# Patient Record
Sex: FEMALE | Race: WHITE
[De-identification: names, ages, dates, MRNs, and addresses within clinical notes are randomized per-mention and may not be internally consistent; named-entity substitution may affect disease eponyms.]

---

## 2020-02-17 ENCOUNTER — HOSPITAL ENCOUNTER (EMERGENCY)
Dept: HOSPITAL 97 - ER | Age: 40
Discharge: HOME | End: 2020-02-17
Payer: SELF-PAY

## 2020-02-17 VITALS — OXYGEN SATURATION: 100 % | DIASTOLIC BLOOD PRESSURE: 87 MMHG | SYSTOLIC BLOOD PRESSURE: 159 MMHG | TEMPERATURE: 98.3 F

## 2020-02-17 DIAGNOSIS — H60.8X2: ICD-10-CM

## 2020-02-17 DIAGNOSIS — H66.92: Primary | ICD-10-CM

## 2020-02-17 PROCEDURE — 99282 EMERGENCY DEPT VISIT SF MDM: CPT

## 2020-02-17 NOTE — XMS REPORT
Summary of Care

 Created on:2019



Patient:Orly Grover

Sex:Female

:1980

External Reference #:[id number]





Demographics







 Address   [address]



   [place] [address]

 

 Mobile Phone  [phone]

 

 Home Phone  [phone]

 

 Phone  [phone]

 

 Email Address  [email or web address]

 

 Preferred Language  English

 

 Marital Status  Single

 

 Jehovah's witness Affiliation  Unknown

 

 Race  White

 

 Ethnic Group  Not  or 









Author







 Organization  [facility]

 

 Address  [address] [place] [address]









Support







 Name  Relationship  Address  Phone

 

 Maribel Segura  Unavailable  [address]  [phone]



     [place] [address]  

 

 Parminder Grover  Unavailable   [address]  [phone]



     [place] [address]  









Care Team Providers







 Name  Role  Phone

 

 Kirsty Gonzalez [facility]  Primary Care Provider  [phone]









Reason for Visit







 Reason  Comments

 

 Assessment  

 

 Rx Concern/Question  







Encounter Details







 Date  Type  Department  Care Team  Description

 

 2019  Telephone  [facility]-  Kirsty Gonzalez,  Assessment; Rx



     [facility]



  Concern/Question



     [address] [facility]



  [address]



  



     [facility]



  



     [address]



  [place], [place] [address] [phone] (Fax)  







Allergies

No Known Allergiesdocumented as of this encounter (statuses as of 2019)



Medications







 Medication  Sig  Dispensed  Refills  Start Date  End Date  Status

 

 sulfamethoxazole-trim  Take 1 tablet by  6 tablet  0  2019  
Active



 ethoprim (BACTRIM DS)  mouth 2 (two)          



 800-160 mg per  times daily for 3          



 tabletIndications:  days.          



 Acute cystitis            



 without hematuria            



documented as of this encounter (statuses as of 2019)



Active Problems







 Problem  Noted Date

 

 BMI 40.0-44.9, adult  2018

 

 Abnormal uterine bleeding  2015

 

 Morbid obesity  2013



documented as of this encounter (statuses as of 2019)



Resolved Problems







 Problem  Noted Date  Resolved Date

 

 Yeast infection of the vagina  2015  08/15/2019

 

 Nexplanon in place  2015  08/15/2019

 

 Surveillance of previously prescribed contraceptive method  2015  08/15/
2019









 Overview: 







 ICD10 Diagnosis Term Replacer Utility









 Encounter for routine gynecological examination  2015  08/15/2019









 Overview: 







 ICD10 Diagnosis Term Replacer Utility









 Encounter for removal of intrauterine contraceptive device  2013

 

 Insertion of Nexplanon  2013









 Overview: 







 Exp 2016









 Encounter for routine gynecological examination  2013









 Overview: 







 ICD10 Diagnosis Term Replacer Utility









 Presence of intrauterine contraceptive device  2013









 Overview: 



Mirena IUD expires 2014.



 



 Medical records received from [facility] 2013. Impression
: IUD in good position within the endometrium. Two small cystic structures in 
the left adnexa probably representing ovarian cysts.



 



 Medical records received from [facility] 2013. Pelvic US: Impression: IUD 
is in place in the fundal endometrium. Otherwise negative study.









 Metrorrhagia  2013









 Overview: 







 Irregular bleeding in 2013.









 Elevated blood pressure reading without diagnosis of  2013



 hypertension    

 

 Need for prophylactic vaccination with combined  2013



 diphtheria-tetanus-pertussis (DTP) vaccine    









 Overview: 







 Out of stock 2013.



documented as of this encounter (statuses as of 2019)



Immunizations







 Name  Administration Dates  Next Due

 

 PPD (TB)  2015  

 

 Rubella  2004  

 

 Td  1999  

 

 Tdap  2013  



documented as of this encounter



Social History







 Tobacco Use  Types  Packs/Day  Years Used  Date

 

 Never Smoker        









 Smokeless Tobacco: Never Used      









 Alcohol Use  Drinks/Week  oz/Week  Comments

 

 No      









 Sex Assigned at Birth  Date Recorded

 

 Not on file  









 Job Start Date  Occupation  Industry

 

 Not on file  Not on file  Not on file









 Travel History  Travel Start  Travel End









 No recent travel history available.



documented as of this encounter



Last Filed Vital Signs

Not on filedocumented in this encounter



Plan of Treatment







 Date  Type  Specialty  Care Team  Description

 

 2019  Technician Visit  [facility]  Lab, [facility]  

 

 11/15/2019  Office Visit  [facility]  Kirsty Gonzalez, FNP



  



       [address]



  



       [address]



  



       [place] [address]



  



       [phone] (Fax)  









 Name  Type  Priority  Associated Diagnoses  Order Schedule

 

 URINE CULTURE  LAB  Routine  Dysuria  Expected: 2019, Expires:



         2019









 Health Maintenance  Due Date  Last Done  Comments

 

 VARICELLA VACCINES (1 of 2 -  1995    



 13+ 2-dose series)      

 

 INFLUENZA VACCINE (#1)  2019    

 

 PAP SMEAR  2021,  



     2013,  



     10/21/2003  

 

 DTaP,Tdap,and Td Vaccines (3  2023,  



 - Td)    1999  

 

 PNEUMOCOCCAL 0-64 YEARS  Aged Out    No longer eligible based



 COMBINED SERIES      on patient's age to



       complete this topic



documented as of this encounter



Results

Not on filedocumented in this encounter



Visit Diagnoses







 Diagnosis

 

 Dysuria - Primary



documented in this encounter



Insurance







 Payer  Benefit Plan  Subscriber ID  Effective  Phone  Address  Type



   / Group    Dates      

 

 [facility]  [facility]  10/1/2018-Pres  [phone]  
Medicaid



 WOMEN      ent  2003



  



           [place]  



           [address]  

 

 [facility]  xxxxxxxxx  2015-Prese  [phone]  [address]  Medicaid



       [address]  [address]



  



           [place]  



           [address]  



documented as of this encounter



Advance Directives







 Name  Relationship  Healthcare Agent  Communication



     Relationship  

 

 Maribel Segura  Mother  Primary healthcare agent  [phone]



       (Home)[phone] (Mobile)

 

 Parminder Grover  Other  First alternate healthcare  [phone]



     agent  (Mobile)[phone] (Home)

## 2020-02-17 NOTE — XMS REPORT
Summary of Care

 Created on:August 15, 2019



Patient:Orly Grover

Sex:Female

:1980

External Reference #:[id number]





Demographics







 Address  [address]



   [place] [address]

 

 Mobile Phone  [phone]

 

 Home Phone  [phone]

 

 Phone  [phone]

 

 Email Address  [email or web address]

 

 Preferred Language  English

 

 Marital Status  Single

 

 Confucianism Affiliation  Unknown

 

 Race  White

 

 Ethnic Group  Not  or 









Author







 Organization  [facility]

 

 Address  [address] [place] [address]









Support







 Name  Relationship  Address  Phone

 

 Maribel Segura  Unavailable  [address]  [phone]



     [place] [address]  

 

 Parminder Grover  Unavailable   [address]  [phone]



     [place] [address]  









Care Team Providers







 Name  Role  Phone

 

 Kirtsy Gonzalez [facility]  Primary Care Provider  [phone]









Reason for Visit







 Reason  Comments

 

 UTI  symptoms







Encounter Details







 Date  Type  Department  Care Team  Description

 

 08/15/2019  Office Visit  [facility]-  Kirsty Gonzalez,  Dysuria (
Primary Dx);



     [facility]



  Abnormal urinalysis



     [address]



  [address]



  



     [facility]



  



     [address]



  [place], [place] [address] [phone] (Fax)  







Allergies

No Known Allergiesdocumented as of this encounter (statuses as of 08/15/2019)



Medications







 Medication  Sig  Dispensed  Refills  Start Date  End Date  Status

 

 Nitrofurantoin&Nit.  Take 1 capsule by  14 capsule  0  08/15/2019  2019  
Active



 Macrocryst  mouth 2 (two)          



 (MACROBID) 100 mg  times daily for 7          



 capsuleIndications:  days.          



 Dysuria            



documented as of this encounter (statuses as of 08/15/2019)



Active Problems







 Problem  Noted Date

 

 BMI 40.0-44.9, adult  2018

 

 Abnormal uterine bleeding  2015

 

 Morbid obesity  2013



documented as of this encounter (statuses as of 08/15/2019)



Resolved Problems







 Problem  Noted Date  Resolved Date

 

 Yeast infection of the vagina  2015  08/15/2019

 

 Nexplanon in place  2015  08/15/2019

 

 Surveillance of previously prescribed contraceptive method  2015  08/15/
2019









 Overview: 







 ICD10 Diagnosis Term Replacer Utility









 Encounter for routine gynecological examination  2015  08/15/2019









 Overview: 







 ICD10 Diagnosis Term Replacer Utility









 Encounter for removal of intrauterine contraceptive device  2013

 

 Insertion of Nexplanon  2013









 Overview: 







 Exp 2016









 Encounter for routine gynecological examination  2013









 Overview: 







 ICD10 Diagnosis Term Replacer Utility









 Presence of intrauterine contraceptive device  2013









 Overview: 



Mirena IUD expires 2014.



 



 Medical records received from [facility] 2013. Impression
: IUD in good position within the endometrium. Two small cystic structures in 
the left adnexa probably representing ovarian cysts.



 



 Medical records received from [facility] 2013. Pelvic US: Impression: IUD 
is in place in the fundal endometrium. Otherwise negative study.









 Metrorrhagia  2013









 Overview: 







 Irregular bleeding in 2013.









 Elevated blood pressure reading without diagnosis of  2013



 hypertension    

 

 Need for prophylactic vaccination with combined  2013



 diphtheria-tetanus-pertussis (DTP) vaccine    









 Overview: 







 Out of stock 2013.



documented as of this encounter (statuses as of 08/15/2019)



Immunizations







 Name  Administration Dates  Next Due

 

 PPD (TB)  2015  

 

 Rubella  2004  

 

 Td  1999  

 

 Tdap  2013  



documented as of this encounter



Social History







 Tobacco Use  Types  Packs/Day  Years Used  Date

 

 Never Smoker        









 Smokeless Tobacco: Never Used      









 Alcohol Use  Drinks/Week  oz/Week  Comments

 

 No      









 Sex Assigned at Birth  Date Recorded

 

 Not on file  









 Job Start Date  Occupation  Industry

 

 Not on file  Not on file  Not on file









 Travel History  Travel Start  Travel End









 No recent travel history available.



documented as of this encounter



Last Filed Vital Signs







 Vital Sign  Reading  Time Taken  Comments

 

 Blood Pressure  133/91  08/15/2019 12:51 PM CDT  

 

 Pulse  95  08/15/2019 12:51 PM CDT  

 

 Temperature  36.4 C (97.5 F)  08/15/2019 12:51 PM CDT  

 

 Respiratory Rate  18  08/15/2019 12:51 PM CDT  

 

 Oxygen Saturation  -  -  

 

 Inhaled Oxygen Concentration  -  -  

 

 Weight  152.4 kg (336 lb)  08/15/2019 12:51 PM CDT  

 

 Height  180.3 cm (5' 11")  08/15/2019 12:51 PM CDT  

 

 Body Mass Index  46.86  08/15/2019 12:51 PM CDT  



documented in this encounter



Progress Notes

Kirsty Gonzalez, ROBBYP - 08/15/2019 12:45 PM CDTFormatting of this note might 
be different from the original.

Chief complaint:

Chief Complaint

Patient presents with

 UTI

  symptoms



URINARY TRACT INFECTION

Patient presents to clinic today with complaints of dysuria, frequency and 
urgency. Patient denies bladder incontinence, flank pain or hematuria. This is 
a new problem. The current episode started 1 to4 weeks ago (2 weeks). The 
problem has been waxing and waning since onset. She is experiencing no pain. 
There has been no fever. She describes her urine color as yellow. Pertinent 
negatives include no abdominal pain, chills, fever or flank pain. Treatments 
tried: cranberry juice, azo.The treatment provided mild relief. She is sexually 
active.



Histories

OB History

 Para Term  AB Living

3 3 3     3

SAB TAB Ectopic Multiple Live Births





# Outcome Date GA Lbr Ellis/2nd Weight Sex Delivery Anes PTL Lv

3 Term

2 Term

1 Term



Past Medical History:

Diagnosis Date

 Anemia

 Anxiety 

 ongoing/ not taking medications

 Depression 

 ongoing/not on medications

 Ear infection

 Metrorrhagia 2013

 complaints today

 Yeast infection of the vagina 2015



Family History

Problem Relation Age of Onset

 Arthritis Maternal Grandmother

 Cancer Maternal Grandmother

     luekemia

 Diabetes Maternal Grandmother

 High cholesterol Maternal Grandmother

 Psychiatry Maternal Grandmother

 Arthritis Maternal Grandfather

 Heart Maternal Grandfather

 Diabetes Maternal Grandfather

 High cholesterol Maternal Grandfather

 Breast Cancer Mother

 Arthritis Mother

 Birth defects Brother

 Other - see comments Brother

     cerebral  palsy

 Arthritis Maternal Aunt



Family Status

Relation Name Status

 MGMo  

 MGFa  

 Mo  Alive

 Bro  

 Fa  Alive

 MAunt  (Not Specified)



No past surgical history on file.

Social History



Socioeconomic History

 Marital status: Single

  Spouse name: Not on file

 Number of children: 3

 Years of education: GED

 Highest education level: Not on file

Occupational History

 Occupation: none

Social Needs

 Financial resource strain: Not on file

 Food insecurity:

  Worry: Not on file

  Inability: Not on file

 Transportation needs:

  Medical: Not on file

  Non-medical: Not on file

Tobacco Use

 Smoking status: Never Smoker

 Smokeless tobacco: Never Used

Substance and Sexual Activity

 Alcohol use: No

 Drug use: No

 Sexual activity: Yes

  Partners: Male

  Birth control/protection: None

  Comment: Last sexual intercourse 2018

Lifestyle

 Physical activity:

  Days per week: Not on file

  Minutes per session: Not on file

 Stress: Not on file

Relationships

 Social connections:

  Talks on phone: Not on file

  Gets together: Not on file

  Attends Anglican service: Not on file

  Active member of club or organization: Not on file

  Attends meetings of clubs or organizations: Not on file

  Relationship status: Not on file

 Intimate partner violence:

  Fear of current or ex partner: Not on file

  Emotionally abused: Not on file

  Physically abused: Not on file

  Forced sexual activity: Not on file

Other Topics Concern

  Service Not Asked

 Blood Transfusions No

 Caffeine Concern Not Asked

 Occupational Exposure Not Asked

 Hobby Hazards Not Asked

 Sleep Concern Not Asked

 Stress Concern Not Asked

 Weight Concern Not Asked

 Special Diet Not Asked

 Back Care Not Asked

 Exercise Not Asked

 Bike Helmet Not Asked

 Seat Belt Not Asked

 Self-Exams Not Asked

Social History Narrative

 Pt states she has no Anglican preference

 Lives with family

 No domestic violence or abuse



Social History



Substance and Sexual Activity

Sexual Activity Yes

 Partners: Male

 Birth control/protection: None

 Comment: Last sexual intercourse 2018







Labs

I have reviewed the patient's labs. and Labs are pending.



Radiology

No new radiology.



Allergies

Orly has No Known Allergies.



Medications

Orly has a current medication list which includes the following prescription(s
): nitrofurantoin&amp;nit. macrocryst.



Review of Systems

Constitutional: Negative for chills and fever.

Gastrointestinal: Negative for abdominal pain.

Genitourinary: Positive for dysuria, urgency and frequency. Negative for 
bladder incontinence, hematuria, flank pain and difficulty urinating.



BP (!) 133/91 (BP Location: Right arm, Patient Position: Sitting, BP CUFF SIZE: 
Adult Small)  | Pulse 95  | Temp 36.4 C (97.5 F) (Oral)  | Resp 18  | Ht 5' 
11" (1.803 m)  | Wt 336 lb (152.4 kg)  | BMI 46.86 kg/m

Pregravid BMI: Could not be calculated



Physical Exam

Vitals reviewed.

Constitutional: She is oriented to person, place, and time. She appears well-
developed and well-nourished. Her body habitus is obese.

Pulmonary/Chest: Normal inspiratory effort.

Abdominal: There is no CVA tenderness.

Neuro/Psychiatric: She has a normal mood and affect. She is oriented to person, 
place, and time.





Assessment/Plan



1. Dysuria

2. Abnormal urinalysis

UA with +nitrites, blood and protein. Will treat empirically and send urine 
culture.

Advised to force fluids 10 + glasses good fluids daily. Good Hygiene. Urinate 
after intercourse. Avoid caffeine products. Avoid spandex and tight clothing. 
Take all meds as instructed. Call or RTC withany increase in symp or fever/
chills. ER warnings reviewed



- POCT URINALYSIS W/O SPECIFIC GRAVITY

- URINE CULTURE

- Nitrofurantoin&amp;Nit. Macrocryst (MACROBID) 100 mg capsule; Take 1 capsule 
by mouth 2 (two) times daily for 7 days.  Dispense: 14 capsule; Refill: 0



Discussed treatment options.

Medications as ordered.

Reviewed patient instructions and provided printed copy.



This visit did not involve counseling and coordination that comprised more than 
50% of the visit time.

Electronically signed by Kirsty Gonzalez FNP at 08/15/2019  1:19 PM 
CDTdocumented in this encounter



Plan of Treatment







 Date  Type  Specialty  Care Team  Description

 

 11/15/2019  Office Visit  [facility]  Kirsty Gonzalez FNP



  



       [address] [place] [address]



  



       [phone] (Fax)  









 Name  Type  Priority  Associated Diagnoses  Date/Time

 

 URINE CULTURE  LAB  Routine  Dysuria  08/15/2019  1:08 PM CDT









 Health Maintenance  Due Date  Last Done  Comments

 

 VARICELLA VACCINES (1 of 2 -  1995    



 13+ 2-dose series)      

 

 INFLUENZA VACCINE (#1)  2019    

 

 PAP SMEAR  2021,  



     2013,  



     10/21/2003  

 

 DTaP,Tdap,and Td Vaccines (3  2023,  



 - Td)    1999  

 

 PNEUMOCOCCAL 0-64 YEARS  Aged Out    No longer eligible based



 COMBINED SERIES      on patient's age to



       complete this topic



documented as of this encounter



Procedures







 Procedure Name  Priority  Date/Time  Associated  Comments



       Diagnosis  

 

 POCT URINALYSIS W/O  Routine  08/15/2019 12:53 PM  Dysuria  Results for this



 SPECIFIC GRAVITY    CDT    procedure are in



         the results



         section.



documented in this encounter



Results

POCT URINALYSIS W/O SPECIFIC GRAVITY (08/15/2019 12:53 PM CDT)





 Component  Value  Ref Range  Performed At  Pathologist



         Signature

 

 POCT PH U  5Comment: clean  5 - 8 mg/dl    



   clatch collection      

 

 POCT U LEUK EST  neg  Negative -    



     Negative    

 

 POCT U NIT  pos  Negative -    



     Negative    

 

 POCT U PROT  trace  Negative -    



     Negative    

 

 POCT U GLU  neg  Negative -    



     Negative    

 

 POCT U KETONE  neg  Negative -    



     Negative    

 

 POCT U BLD  trace  Negative -    



     Negative    









 Specimen

 

 Urine - URINE, CLEAN CATCH



documented in this encounter



Visit Diagnoses







 Diagnosis

 

 Dysuria - Primary

 

 Abnormal urinalysis







 Other nonspecific finding on examination of urine



documented in this encounter



Insurance







 Payer  Benefit Plan  Subscriber ID  Effective  Phone  Address  Type



   / Group    Dates      

 

 [facility]  xxxxxxxxx  10/1/2018-Pres  [phone]  [address]  
Medicaid



 WOMEN      [address]



  



           [place], [place]  



           [address]  









 Guarantor Name  Account Type  Relation to  Date of Birth  Phone  Billing



     Patient      Address

 

 Orly Grover Delfina  [facility]  Self  1980  [phone]  [address]



         (Home)  [address]







           [place]



           [address]



documented as of this encounter



Advance Directives







 Name  Relationship  Healthcare Agent  Communication



     Relationship  

 

 Maribel Segura  Mother  Primary healthcare agent  [phone]



       (Home)[phone] (Mobile)

 

 Parminder Grover  Other  First alternate healthcare  [phone]



     agent  (Mobile)[phone] (Home)

## 2020-02-17 NOTE — ER
Nurse's Notes                                                                                     

 [facility]                                                                 

Name: Orly Grover                                                                                

Age: 40 yrs                                                                                       

Sex: Female                                                                                       

: 1980                                                                                   

MRN: [id number]                                                                                   

Arrival Date: 2020                                                                          

Time: 12:53                                                                                       

Account#: [id number]                                                                            

Bed 10                                                                                            

Private MD:                                                                                       

Diagnosis: Otitis media, unspecified, left ear;Otitis externa in other diseases classified        

  elsewhere, left ear                                                                             

                                                                                                  

Presentation:                                                                                     

                                                                                             

12:59 Presenting complaint: Left ear pain x 5 days. Transition of care: patient was not       hb  

      received from another setting of care. Onset of symptoms was 2020. Risk        

      Assessment: Do you want to hurt yourself or someone else? Patient reports no desire to      

      harm self or others. Initial Sepsis Screen: Does the patient meet any 2 criteria? No.       

      Patient's initial sepsis screen is negative. Does the patient have a suspected source       

      of infection? No. Patient's initial sepsis screen is negative. Care prior to arrival:       

      Medication(s) given: Motrin, at 1000.                                                       

12:59 Method Of Arrival: Ambulatory                                                             

12:59 Acuity: KAREN 4                                                                           hb  

                                                                                                  

OB/GYN:                                                                                           

13:00 LMP 2020                                                                           hb  

                                                                                                  

Historical:                                                                                       

- Allergies:                                                                                      

13:00 No Known Allergies;                                                                     hb  

- Home Meds:                                                                                      

13:00 None [Active];                                                                          hb  

- PMHx:                                                                                           

13:00 None;                                                                                   hb  

- PSHx:                                                                                           

13:00 None;                                                                                   hb  

                                                                                                  

- Immunization history:: Adult Immunizations up to date.                                          

- Coronavirus screen:: The patient has NOT traveled to [place] in the past 14 days. The             

  patient has NOT had contact with known/suspected case of Coronavirus? Proceed with              

  normal triage procedures.                                                                       

- Social history:: Smoking status: Patient denies any tobacco usage or history of.                

- Ebola Screening: : No symptoms or risks identified at this time.                                

                                                                                                  

                                                                                                  

Screenin:01 Abuse screen: Denies threats or abuse. Denies injuries from another. Nutritional        ss  

      screening: No deficits noted. Tuberculosis screening: Never had TB. Fall Risk None          

      identified.                                                                                 

                                                                                                  

Assessment:                                                                                       

14:01 General: Appears in no apparent distress. comfortable, Behavior is calm, cooperative,   ss  

      Denies fever, feeling ill, fatigue, chills. Pain: Complains of pain in left ear Pain        

      currently is 7 out of 10 on a pain scale. Quality of pain is described as aching.           

      Neuro: Level of Consciousness is awake, alert, obeys commands, Oriented to person,          

      place, time, situation. Cardiovascular: No deficits noted. Respiratory: Airway is           

      patent Respiratory effort is even, unlabored, Respiratory pattern is regular,               

      symmetrical. GI: Patient currently denies diarrhea, vomiting. : No signs and/or           

      symptoms were reported regarding the genitourinary system. EENT: Nares are clear Throat     

      is clear. Derm: Skin is intact, is healthy with good turgor, Skin is pink, warm \T\ dry.    

      normal. Musculoskeletal: Circulation, motion, and sensation intact. Range of motion:        

      intact in all extremities.                                                                  

                                                                                                  

Vital Signs:                                                                                      

13:00  / 87; Pulse 88; Resp 16; Temp 98.3; Pulse Ox 100% on R/A; Weight 149.69 kg;      hb  

      Height 5 ft. 11 in. (180.34 cm); Pain 7/10;                                                 

13:00 Body Mass Index 46.03 (149.69 kg, 180.34 cm)                                            hb  

                                                                                                  

ED Course:                                                                                        

12:53 Patient arrived in ED.                                                                  ag5 

12:59 Triage completed.                                                                         

13:00 Arm band placed on.                                                                       

13:55 Liu Dela Cruz PA is PHCP.                                                                  

13:55 Liu Fox MD is Attending Physician.                                             cp  

14:01 Ann Luna RN is Primary Nurse.                                                      

14:01 Patient has correct armband on for positive identification. Bed in low position. Call   ss  

      light in reach.                                                                             

14:02 Hedy Mcdonald MD is Referral Physician.                                           cp  

14:08 No provider procedures requiring assistance completed. Patient did not have IV access   ss  

      during this emergency room visit.                                                           

                                                                                                  

Administered Medications:                                                                         

No medications were administered                                                                  

                                                                                                  

                                                                                                  

Outcome:                                                                                          

14:02 Discharge ordered by MD.                                                                cp  

14:08 Discharged to home ambulatory.                                                          ss  

14:08 Condition: good                                                                             

14:08 Discharge instructions given to patient, Instructed on discharge instructions, follow       

      up and referral plans. medication usage, Demonstrated understanding of instructions,        

      follow-up care, medications, Prescriptions given X 2.                                       

14:09 Patient left the ED.                                                                    ss  

                                                                                                  

Signatures:                                                                                       

Ann Luna RN RN                                                      

Liu Dela Cruz PA PA cp Baxter, Heather, RN                     RN                                                      

Marilee Henry                                ag5                                                  

                                                                                                  

**************************************************************************************************

## 2020-02-17 NOTE — XMS REPORT
Summary of Care

 Created on:2019



Patient:Orly Grover

Sex:Female

:1980

External Reference #:[id number]





Demographics







 Address   [address]



   [place] [address]

 

 Mobile Phone  [phone]

 

 Home Phone  [phone]

 

 Phone  [phone]

 

 Email Address  [email or web address]

 

 Preferred Language  English

 

 Marital Status  Single

 

 Advent Affiliation  Unknown

 

 Race  White

 

 Ethnic Group  Not  or 









Author







 Organization  [facility]

 

 Address  [address] [place] [address]









Support







 Name  Relationship  Address  Phone

 

 Maribel Segura  Unavailable  [address]  [phone]



     [place] [address]  

 

 Parminder Grover  Unavailable   [address]  [phone]



     [place] [address]  









Care Team Providers







 Name  Role  Phone

 

 Kirsty Gonzalez  Primary Care Provider  [phone]









Reason for Visit







 Reason  Comments

 

 Lab Results  







Encounter Details







 Date  Type  Department  Care Team  Description

 

 2019  [facility]  [facility]



  Lab Results



     [address]



  [address]



  



     [place] [address]



  [facility]



  



     [phone]  [place] [address] [phone] (Fax)  







Allergies

No Known Allergiesdocumented as of this encounter (statuses as of 2019)



Medications







 Medication  Sig  Dispensed  Refills  Start Date  End Date  Status

 

 sulfamethoxazole-  Take 1 tablet  6 tablet  0  2019  Active



 trimethoprim  by mouth 2          



 (BACTRIM DS)  (two) times          



 800-160 mg per  daily for 3          



 tabletIndications  days.          



 : Acute cystitis            



 without hematuria            

 

 Nitrofurantoin&Ni  Take 1 capsule  14 capsule  0  08/15/2019  2019  
Discontinued



 t. Macrocryst  by mouth 2          



 (MACROBID) 100 mg  (two) times          



 capsuleIndication  daily for 7          



 s: Dysuria  days.          



documented as of this encounter (statuses as of 2019)



Active Problems







 Problem  Noted Date

 

 BMI 40.0-44.9, adult  2018

 

 Abnormal uterine bleeding  2015

 

 Morbid obesity  2013



documented as of this encounter (statuses as of 2019)



Resolved Problems







 Problem  Noted Date  Resolved Date

 

 Yeast infection of the vagina  2015  08/15/2019

 

 Nexplanon in place  2015  08/15/2019

 

 Surveillance of previously prescribed contraceptive method  2015  08/15/
2019









 Overview: 







 ICD10 Diagnosis Term Replacer Utility









 Encounter for routine gynecological examination  2015  08/15/2019









 Overview: 







 ICD10 Diagnosis Term Replacer Utility









 Encounter for removal of intrauterine contraceptive device  2013

 

 Insertion of Nexplanon  2013









 Overview: 







 Exp 2016









 Encounter for routine gynecological examination  2013









 Overview: 







 ICD10 Diagnosis Term Replacer Utility









 Presence of intrauterine contraceptive device  2013









 Overview: 



Mirena IUD expires 2014.



 



 Medical records received from [facility] 2013. Impression
: IUD in good position within the endometrium. Two small cystic structures in 
the left adnexa probably representing ovarian cysts.



 



 Medical records received from [facility] 2013. Pelvic US: Impression: IUD 
is in place in the fundal endometrium. Otherwise negative study.









 Metrorrhagia  2013









 Overview: 







 Irregular bleeding in 2013.









 Elevated blood pressure reading without diagnosis of  2013



 hypertension    

 

 Need for prophylactic vaccination with combined  2013



 diphtheria-tetanus-pertussis (DTP) vaccine    









 Overview: 







 Out of stock 2013.



documented as of this encounter (statuses as of 2019)



Immunizations







 Name  Administration Dates  Next Due

 

 PPD (TB)  2015  

 

 Rubella  2004  

 

 Td  1999  

 

 Tdap  2013  



documented as of this encounter



Social History







 Tobacco Use  Types  Packs/Day  Years Used  Date

 

 Never Smoker        









 Smokeless Tobacco: Never Used      









 Alcohol Use  Drinks/Week  oz/Week  Comments

 

 No      









 Sex Assigned at Birth  Date Recorded

 

 Not on file  









 Job Start Date  Occupation  Industry

 

 Not on file  Not on file  Not on file









 Travel History  Travel Start  Travel End









 No recent travel history available.



documented as of this encounter



Last Filed Vital Signs

Not on filedocumented in this encounter



Plan of Treatment







 Date  Type  Specialty  Care Team  Description

 

 11/15/2019  Office Visit  [facility]  Kirsty Gonzalez, FNP



  



       [address]



  



       [place] [address]



  



       [phone] (Fax)  









 Health Maintenance  Due Date  Last Done  Comments

 

 VARICELLA VACCINES (1 of 2 -  1995    



 13+ 2-dose series)      

 

 INFLUENZA VACCINE (#1)  2019    

 

 PAP SMEAR  2021,  



     2013,  



     10/21/2003  

 

 DTaP,Tdap,and Td Vaccines (3  2023,  



 - Td)    1999  

 

 PNEUMOCOCCAL 0-64 YEARS  Aged Out    No longer eligible based



 COMBINED SERIES      on patient's age to



       complete this topic



documented as of this encounter



Results

Not on filedocumented in this encounter



Visit Diagnoses







 Diagnosis

 

 Acute cystitis without hematuria - Primary







 Acute cystitis



documented in this encounter



Insurance







 Payer  Benefit Plan  Subscriber ID  Effective  Phone  Address  Type



   / Group    Dates      

 

 [facility]  [facility]  10/1/2018-Pres  [phone]  [address]  
Medicaid



 WOMEN      ent  2005



  



           [place], [place]  



           [address]  

 

 [facility]  [facility]  2015-Prese  [phone]  [address]  Medicaid



       nt  [address]



  



           [place]  



           [address]  



documented as of this encounter



Advance Directives







 Name  Relationship  Healthcare Agent  Communication



     Relationship  

 

 Maribel Segura  Mother  Primary healthcare agent  [phone]



       (Home)[phone] (Mobile)

 

 Parminder Grover  Other  First alternate healthcare  [phone]



     agent  (Mobile)[phone] (Home)

## 2020-02-17 NOTE — XMS REPORT
Summary of Care

 Created on:2019



Patient:Orly Grover

Sex:Female

:1980

External Reference #:[id number]





Demographics







 Address  [address]



   [place] [address]

 

 Mobile Phone  [phone]

 

 Home Phone  [phone]

 

 Phone  [phone]

 

 Email Address  [email or web address]

 

 Preferred Language  English

 

 Marital Status  Single

 

 Restoration Affiliation  Unknown

 

 Race  White

 

 Ethnic Group  Not  or 









Author







 Organization  [facility]

 

 Address  [address] [place] [address]









Support







 Name  Relationship  Address  Phone

 

 Maribel Segura  Unavailable  [address]  [phone]



     [place] [address]  

 

 Parminder Grover  Unavailable  [address]  [phone]



     [place] [address]  









Care Team Providers







 Name  Role  Phone

 

 Kirsty Gonzalez  Primary Care Provider  [phone]









Reason for Visit







 Reason  Comments

 

 Talk To Nurse  







Encounter Details







 Date  Type  Department  Care Team  Description

 

 2019  Telephone  [facility]  Kirsty Gonzalez, MIRANDA



  Talk To Nurse



     [place]



  [address]



  



     [address] [facility] [address]



  



     [place] [address]



  [address] [place] [address] [phone] (Fax)  







Allergies

No Known Allergiesdocumented as of this encounter (statuses as of 2019)



Medications

No known medicationsdocumented as of this encounter (statuses as of 2019)



Active Problems







 Problem  Noted Date

 

 BMI 40.0-44.9, adult  2018

 

 Abnormal uterine bleeding  2015

 

 Morbid obesity  2013



documented as of this encounter (statuses as of 2019)



Resolved Problems







 Problem  Noted Date  Resolved Date

 

 Yeast infection of the vagina  2015  08/15/2019

 

 Nexplanon in place  2015  08/15/2019

 

 Surveillance of previously prescribed contraceptive method  2015  08/15/
2019









 Overview: 







 ICD10 Diagnosis Term Replacer Utility









 Encounter for routine gynecological examination  2015  08/15/2019









 Overview: 







 ICD10 Diagnosis Term Replacer Utility









 Encounter for removal of intrauterine contraceptive device  2013

 

 Insertion of Nexplanon  2013









 Overview: 







 Exp 2016









 Encounter for routine gynecological examination  2013









 Overview: 







 ICD10 Diagnosis Term Replacer Utility









 Presence of intrauterine contraceptive device  2013









 Overview: 



Mirena IUD expires 2014.



 



 Medical records received from [facility] 2013. Impression
: IUD in good position within the endometrium. Two small cystic structures in 
the left adnexa probably representing ovarian cysts.



 



 Medical records received from [facility] 2013. Pelvic US: Impression: IUD 
is in place in the fundal endometrium. Otherwise negative study.









 Metrorrhagia  2013









 Overview: 







 Irregular bleeding in 2013.









 Elevated blood pressure reading without diagnosis of  2013



 hypertension    

 

 Need for prophylactic vaccination with combined  2013



 diphtheria-tetanus-pertussis (DTP) vaccine    









 Overview: 







 Out of stock 2013.



documented as of this encounter (statuses as of 2019)



Immunizations







 Name  Administration Dates  Next Due

 

 PPD (TB)  2015  

 

 Rubella  2004  

 

 Td  1999  

 

 Tdap  2013  



documented as of this encounter



Social History







 Tobacco Use  Types  Packs/Day  Years Used  Date

 

 Never Smoker        









 Smokeless Tobacco: Never Used      









 Alcohol Use  Drinks/Week  oz/Week  Comments

 

 No      









 Sex Assigned at Birth  Date Recorded

 

 Not on file  









 Job Start Date  Occupation  Industry

 

 Not on file  Not on file  Not on file









 Travel History  Travel Start  Travel End









 No recent travel history available.



documented as of this encounter



Last Filed Vital Signs

Not on filedocumented in this encounter



Plan of Treatment







 Date  Type  Specialty  Care Team  Description

 

 2019  Technician Visit  [facility]  

 

 11/15/2019  Office Visit  [facility]  Kirsty Gonzalez, FNP



  



       [address]



  



       [place] [address]



  



       [phone] (Fax)  









 Health Maintenance  Due Date  Last Done  Comments

 

 VARICELLA VACCINES (1 of 2 -  1995    



 13+ 2-dose series)      

 

 INFLUENZA VACCINE (#1)  2019    

 

 PAP SMEAR  2021,  



     2013,  



     10/21/2003  

 

 DTaP,Tdap,and Td Vaccines (3  2023,  



 - Td)    1999  

 

 PNEUMOCOCCAL 0-64 YEARS  Aged Out    No longer eligible based



 COMBINED SERIES      on patient's age to



       complete this topic



documented as of this encounter



Results

Not on filedocumented in this encounter



Insurance







 Payer  Benefit Plan  Subscriber ID  Effective  Phone  Address  Type



   / Group    Dates      

 

 [facility]  xxxxxxxxx  10/1/2018-Pres  [phone]  [address]  
Medicaid



 WOMEN      ent  2005



  



           [place]  



           [address]  

 

 [facility]  xxxxxxxxx  2015-Prese  [phone]  [address]  Medicaid



       nt  [address]



  



           [place]  



           [address]  



documented as of this encounter



Advance Directives







 Name  Relationship  Healthcare Agent  Communication



     Relationship  

 

 Maribel Segura  Mother  Primary healthcare agent  [phone]



       (Home)[phone] (Mobile)

 

 Parminder Grover  Other  [employer] alternate healthcare  [phone]



     agent  (Mobile)[phone] (Home)

## 2020-02-17 NOTE — XMS REPORT
Patient Summary Document

 Created on:2020



Patient:DARLENE JORGE

Sex:Female

:1980

External Reference #:[id number]





Demographics







 Address  [address] [address] [address]



   [place] [address]

 

 Home Phone  [phone]

 

 Email Address  NONE

 

 Preferred Language  Unknown

 

 Marital Status  Unknown

 

 Spiritism Affiliation  Unknown

 

 Race  Unknown

 

 Additional Race(s)  Unavailable

 

 Ethnic Group  Unknown









Author







 Organization  [facility]

 

 Address  [facility] [place] [address]

 

 Phone  [phone]









Care Team Providers







 Name  Role  Phone

 

 Unavailable  Unavailable  Unavailable









Problems

This patient has no known problems.



Allergies, Adverse Reactions, Alerts

This patient has no known allergies or adverse reactions.



Medications

This patient has no known medications.

## 2020-02-17 NOTE — EDPHYS
Physician Documentation                                                                           

 [facility]                                                                 

Name: Orly Grover                                                                                

Age: 40 yrs                                                                                       

Sex: Female                                                                                       

: 1980                                                                                   

MRN: [id number]                                                                                   

Arrival Date: 2020                                                                          

Time: 12:53                                                                                       

Account#: [id number]                                                                            

Bed 10                                                                                            

Private MD:                                                                                       

ED Physician Liu Fox                                                                      

HPI:                                                                                              

                                                                                             

13:59 This 40 yrs old  Female presents to ER via Ambulatory with complaints of Ear   cp  

      Pain.                                                                                       

13:59 The patient presents with pain, that is acute. The complaints affect the left ear.      cp  

      Onset: The symptoms/episode began/occurred 5 day(s) ago. Associated signs and symptoms:     

      Pertinent positives: drainage from ear, Pertinent negatives: cough, fever.                  

                                                                                                  

OB/GYN:                                                                                           

13:00 LMP 2020                                                                           hb  

                                                                                                  

Historical:                                                                                       

- Allergies:                                                                                      

13:00 No Known Allergies;                                                                     hb  

- Home Meds:                                                                                      

13:00 None [Active];                                                                          hb  

- PMHx:                                                                                           

13:00 None;                                                                                   hb  

- PSHx:                                                                                           

13:00 None;                                                                                   hb  

                                                                                                  

- Immunization history:: Adult Immunizations up to date.                                          

- Coronavirus screen:: The patient has NOT traveled to [place] in the past 14 days. The             

  patient has NOT had contact with known/suspected case of Coronavirus? Proceed with              

  normal triage procedures.                                                                       

- Social history:: Smoking status: Patient denies any tobacco usage or history of.                

- Ebola Screening: : No symptoms or risks identified at this time.                                

                                                                                                  

                                                                                                  

ROS:                                                                                              

14:00 Eyes: Negative for injury, pain, redness, and discharge.                                cp  

14:00 Constitutional: Negative for body aches, chills, fever.                                     

14:00 ENT: Positive for drainage from ear(s), ear pain, Negative for sore throat, difficulty      

      swallowing, difficulty handling secretions.                                                 

14:00 Respiratory: Negative for cough, wheezing.                                                  

14:00 Skin: Negative for rash.                                                                    

14:00 All other systems are negative.                                                             

                                                                                                  

Exam:                                                                                             

14:00 Head/Face:  Normocephalic, atraumatic.                                                  cp  

14:00 Constitutional: The patient appears in no acute distress, alert, awake, non-toxic, well     

      developed, well nourished.                                                                  

                                                                                                  

Vital Signs:                                                                                      

13:00  / 87; Pulse 88; Resp 16; Temp 98.3; Pulse Ox 100% on R/A; Weight 149.69 kg;      hb  

      Height 5 ft. 11 in. (180.34 cm); Pain 7/10;                                                 

13:00 Body Mass Index 46.03 (149.69 kg, 180.34 cm)                                            hb  

                                                                                                  

MDM:                                                                                              

13:59 Patient medically screened.                                                             cp  

14:01 Data reviewed: vital signs, nurses notes, and as a result, I will discharge patient.    cp  

                                                                                                  

Administered Medications:                                                                         

No medications were administered                                                                  

                                                                                                  

                                                                                                  

Disposition:                                                                                      

20 14:02 Discharged to Home. Impression: Otitis media, unspecified, left ear, Otitis        

  externa in other diseases classified elsewhere, left ear.                                       

- Condition is Stable.                                                                            

- Discharge Instructions: Otitis Media, Adult, Otitis Externa.                                    

- Prescriptions for Amoxicillin 875 mg Oral Tablet - take 1 tablet by ORAL route every            

  12 hours for 10 days; 20 tablet. Ciprodex 0.3- 0.1 % Otic Drops, Suspension - instill           

  4 drop by OTIC route every 12 hours for 7 days , for ears ONLY; 1 Container.                    

- Medication Reconciliation Form, Thank You Letter, Antibiotic Education, Prescription            

  Opioid Use form.                                                                                

- Follow up: Hedy Mcdonald MD; When: 2 - 3 days; Reason: Worsening of condition.            

- Problem is new.                                                                                 

- Symptoms are unchanged.                                                                         

                                                                                                  

                                                                                                  

                                                                                                  

Addendum:                                                                                         

2020                                                                                        

     08:36 Co-signature as Attending Physician, Liu Fox MD I agree with the assessment and  c
ha

           plan of care.                                                                          

                                                                                                  

Signatures:                                                                                       

Liu Fox MD MD cha Smirch, Shelby, JONNIE                      RN   Liu Beauchamp PA PA cp Baxter, Heather, JONNIE                     RN                                                      

                                                                                                  

Corrections: (The following items were deleted from the chart)                                    

                                                                                             

14:09 14:02 2020 14:02 Discharged to Home. Impression: Otitis media, unspecified, left  ss  

      ear; Otitis externa in other diseases classified elsewhere, left ear. Condition is          

      Stable. Forms are Medication Reconciliation Form, Thank You Letter, Antibiotic              

      Education, Prescription Opioid Use. Follow up: Hedy Mcdonald; When: 2 - 3 days;         

      Reason: Worsening of condition. Problem is new. Symptoms are unchanged. cp                  

                                                                                                  

**************************************************************************************************

## 2020-02-17 NOTE — XMS REPORT
Summary of Care

 Created on:2020



Patient:Orly Grover

Sex:Female

:1980

External Reference #:[id number]





Demographics







 Address   [address]



   [place] [address]

 

 Mobile Phone  [phone]

 

 Home Phone  [phone]

 

 Phone  [phone]

 

 Email Address  [email or web address]

 

 Preferred Language  English

 

 Marital Status  Single

 

 Pentecostalism Affiliation  Unknown

 

 Race  White

 

 Ethnic Group  Not  or 









Author







 Organization  [facility]

 

 Address  [address] [place] [address]









Support







 Name  Relationship  Address  Phone

 

 Maribel Segura  Unavailable  [address]  [phone]



     [place] [address]  

 

 Parminder Grover  Unavailable   [address]  [phone]



     [place] [address]  









Care Team Providers







 Name  Role  Phone

 

 CarlosKirsty GOLDEN JEAN  Primary Care Provider  [phone]









Encounter Details







 Date  Type  Department  Care Team  Description

 

 2019  Patient [facility]  Doctor Unassigned,  



     [facility]



  [place]



  



     [address] [address]  



     [address] floor



    



     [place] [address] [phone]    







Allergies

No Known Allergiesdocumented as of this encounter (statuses as of 2020)



Medications

No known medicationsdocumented as of this encounter (statuses as of 2020)



Active Problems







 Problem  Noted Date

 

 BMI 40.0-44.9, adult  2018

 

 Abnormal uterine bleeding  2015

 

 Morbid obesity  2013



documented as of this encounter (statuses as of 2020)



Resolved Problems







 Problem  Noted Date  Resolved Date

 

 Yeast infection of the vagina  2015  08/15/2019

 

 Nexplanon in place  2015  08/15/2019

 

 Surveillance of previously prescribed contraceptive method  2015  08/15/
2019









 Overview: 







 ICD10 Diagnosis Term Replacer Utility









 Encounter for routine gynecological examination  2015  08/15/2019









 Overview: 







 ICD10 Diagnosis Term Replacer Utility









 Encounter for removal of intrauterine contraceptive device  2013

 

 Insertion of Nexplanon  2013









 Overview: 







 Exp 2016









 Encounter for routine gynecological examination  2013









 Overview: 







 ICD10 Diagnosis Term Replacer Utility









 Presence of intrauterine contraceptive device  2013









 Overview: 



Mirena IUD expires 2014.



 



 Medical records received from [facility] 2013. Impression
: IUD in good position within the endometrium. Two small cystic structures in 
the left adnexa probably representing ovarian cysts.



 



 Medical records received from [facility] 2013. Pelvic US: Impression: IUD 
is in place in the fundal endometrium. Otherwise negative study.









 Metrorrhagia  2013









 Overview: 







 Irregular bleeding in 2013.









 Elevated blood pressure reading without diagnosis of  2013



 hypertension    

 

 Need for prophylactic vaccination with combined  2013



 diphtheria-tetanus-pertussis (DTP) vaccine    









 Overview: 







 Out of stock 2013.



documented as of this encounter (statuses as of 2020)



Immunizations







 Name  Administration Dates  Next Due

 

 PPD (TB)  2015  

 

 Rubella  2004  

 

 Td  1999  

 

 Tdap  2013  



documented as of this encounter



Social History







 Tobacco Use  Types  Packs/Day  Years Used  Date

 

 Never Smoker        









 Smokeless Tobacco: Never Used      









 Alcohol Use  Drinks/Week  oz/Week  Comments

 

 No      









 Sex Assigned at Birth  Date Recorded

 

 Not on file  









 Job Start Date  Occupation  Industry

 

 Not on file  Not on file  Not on file









 Travel History  Travel Start  Travel End









 No recent travel history available.



documented as of this encounter



Last Filed Vital Signs

Not on filedocumented in this encounter



Plan of Treatment







 Date  Type  Specialty  Care Team  Description

 

 03/10/2020  Hospital Encounter  Ambulatory Surgical  [facility]  Abnormal 
uterine



       MD MADHURI



  bleeding



       [address]  



       [id number] [place] [address]  



       [id number] [phone]  



       (Fax)  

 

 03/10/2020  Surgery  Surgery  [facility]  LAPAROSCOPIC ROBOTIC



       MD MADHURI



  ASSISTED VAGINAL



       [address]  HYSTERECTOMY



       [id number]



  



       [place], [place]  



       [id number] [phone]  



       (Fax)  









 Health Maintenance  Due Date  Last Done  Comments

 

 INFLUENZA VACCINE (#1)  2019    

 

 PAP SMEAR  2021,  



     2013,  



     10/21/2003  

 

 DTaP,Tdap,and Td Vaccines (3  2023,  



 - Td)    1999  

 

 PNEUMOCOCCAL 0-64 YEARS  Aged Out    No longer eligible based



 COMBINED SERIES      on patient's age to



       complete this topic

 

 VARICELLA VACCINES  Discontinued    



documented as of this encounter



Results

Not on filedocumented in this encounter



Insurance







 Payer  Benefit Plan  Subscriber ID  Effective  Phone  Address  Type



   / Group    Dates      

 

 [facility]  xxxxxxxxx  10/1/2018-Pres  [phone]  [address]  
Medicaid



 WOMEN      ent  2005



  



           [place]  



           [address]  

 

 [facility]  xxxxxxxxx  2015-Prese  [phone]  [address]  Medicaid



       nt  [address]



  



           [place]  



           [address]  



documented as of this encounter



Advance Directives







 Name  Relationship  Healthcare Agent  Communication



     Relationship  

 

 Maribel Segura  Mother  Primary healthcare agent  [phone]



       (Home)[phone] (Mobile)

 

 Parminder Grover  Other  First alternate healthcare  [phone]



     agent  (Mobile)[phone] (Home)

## 2020-02-17 NOTE — XMS REPORT
Summary of Care

 Created on:August 15, 2019



Patient:Orly Grover

Sex:Female

:1980

External Reference #:[id number]





Demographics







 Address  [address]



   [place] [address]

 

 Mobile Phone  [phone]

 

 Home Phone  [phone]

 

 Phone  [phone]

 

 Email Address  [email or web address]

 

 Preferred Language  English

 

 Marital Status  Single

 

 Hinduism Affiliation  Unknown

 

 Race  White

 

 Ethnic Group  Not  or 









Author







 Organization  [facility]

 

 Address  [address] [place] [address]









Support







 Name  Relationship  Address  Phone

 

 Maribel Segura  Unavailable  [address]  [phone]



     [place] [address]  

 

 Parminder Grover  Unavailable   [address]  [phone]



     [place] [address]  









Care Team Providers







 Name  Role  Phone

 

 Kirsty Gonzalez [facility]  Primary Care Provider  [phone]









Reason for Visit







 Reason  Comments

 

 UTI  symptoms







Encounter Details







 Date  Type  Department  Care Team  Description

 

 08/15/2019  Office Visit  [facility]-  Kirsty Gonzalez,  Dysuria (
Primary Dx);



     [facility]



  Abnormal urinalysis



     [address]



  [address]



  



     [facility]



  



     [address]



  [place], [place] [address] [phone] (Fax)  







Allergies

No Known Allergiesdocumented as of this encounter (statuses as of 08/15/2019)



Medications







 Medication  Sig  Dispensed  Refills  Start Date  End Date  Status

 

 Nitrofurantoin&Nit.  Take 1 capsule by  14 capsule  0  08/15/2019  2019  
Active



 Macrocryst  mouth 2 (two)          



 (MACROBID) 100 mg  times daily for 7          



 capsuleIndications:  days.          



 Dysuria            



documented as of this encounter (statuses as of 08/15/2019)



Active Problems







 Problem  Noted Date

 

 BMI 40.0-44.9, adult  2018

 

 Abnormal uterine bleeding  2015

 

 Morbid obesity  2013



documented as of this encounter (statuses as of 08/15/2019)



Resolved Problems







 Problem  Noted Date  Resolved Date

 

 Yeast infection of the vagina  2015  08/15/2019

 

 Nexplanon in place  2015  08/15/2019

 

 Surveillance of previously prescribed contraceptive method  2015  08/15/
2019









 Overview: 







 ICD10 Diagnosis Term Replacer Utility









 Encounter for routine gynecological examination  2015  08/15/2019









 Overview: 







 ICD10 Diagnosis Term Replacer Utility









 Encounter for removal of intrauterine contraceptive device  2013

 

 Insertion of Nexplanon  2013









 Overview: 







 Exp 2016









 Encounter for routine gynecological examination  2013









 Overview: 







 ICD10 Diagnosis Term Replacer Utility









 Presence of intrauterine contraceptive device  2013









 Overview: 



Mirena IUD expires 2014.



 



 Medical records received from [facility] 2013. Impression
: IUD in good position within the endometrium. Two small cystic structures in 
the left adnexa probably representing ovarian cysts.



 



 Medical records received from [facility] 2013. Pelvic US: Impression: IUD 
is in place in the fundal endometrium. Otherwise negative study.









 Metrorrhagia  2013









 Overview: 







 Irregular bleeding in 2013.









 Elevated blood pressure reading without diagnosis of  2013



 hypertension    

 

 Need for prophylactic vaccination with combined  2013



 diphtheria-tetanus-pertussis (DTP) vaccine    









 Overview: 







 Out of stock 2013.



documented as of this encounter (statuses as of 08/15/2019)



Immunizations







 Name  Administration Dates  Next Due

 

 PPD (TB)  2015  

 

 Rubella  2004  

 

 Td  1999  

 

 Tdap  2013  



documented as of this encounter



Social History







 Tobacco Use  Types  Packs/Day  Years Used  Date

 

 Never Smoker        









 Smokeless Tobacco: Never Used      









 Alcohol Use  Drinks/Week  oz/Week  Comments

 

 No      









 Sex Assigned at Birth  Date Recorded

 

 Not on file  









 Job Start Date  Occupation  Industry

 

 Not on file  Not on file  Not on file









 Travel History  Travel Start  Travel End









 No recent travel history available.



documented as of this encounter



Last Filed Vital Signs







 Vital Sign  Reading  Time Taken  Comments

 

 Blood Pressure  133/91  08/15/2019 12:51 PM CDT  

 

 Pulse  95  08/15/2019 12:51 PM CDT  

 

 Temperature  36.4 C (97.5 F)  08/15/2019 12:51 PM CDT  

 

 Respiratory Rate  18  08/15/2019 12:51 PM CDT  

 

 Oxygen Saturation  -  -  

 

 Inhaled Oxygen Concentration  -  -  

 

 Weight  152.4 kg (336 lb)  08/15/2019 12:51 PM CDT  

 

 Height  180.3 cm (5' 11")  08/15/2019 12:51 PM CDT  

 

 Body Mass Index  46.86  08/15/2019 12:51 PM CDT  



documented in this encounter



Progress Notes

Kirsty Gonzalez, ROBBYP - 08/15/2019 12:45 PM CDTFormatting of this note might 
be different from the original.

Chief complaint:

Chief Complaint

Patient presents with

 UTI

  symptoms



URINARY TRACT INFECTION

Patient presents to clinic today with complaints of dysuria, frequency and 
urgency. Patient denies bladder incontinence, flank pain or hematuria. This is 
a new problem. The current episode started 1 to4 weeks ago (2 weeks). The 
problem has been waxing and waning since onset. She is experiencing no pain. 
There has been no fever. She describes her urine color as yellow. Pertinent 
negatives include no abdominal pain, chills, fever or flank pain. Treatments 
tried: cranberry juice, azo.The treatment provided mild relief. She is sexually 
active.



Histories

OB History

 Para Term  AB Living

3 3 3     3

SAB TAB Ectopic Multiple Live Births





# Outcome Date GA Lbr Ellis/2nd Weight Sex Delivery Anes PTL Lv

3 Term

2 Term

1 Term



Past Medical History:

Diagnosis Date

 Anemia

 Anxiety 

 ongoing/ not taking medications

 Depression 

 ongoing/not on medications

 Ear infection

 Metrorrhagia 2013

 complaints today

 Yeast infection of the vagina 2015



Family History

Problem Relation Age of Onset

 Arthritis Maternal Grandmother

 Cancer Maternal Grandmother

     luekemia

 Diabetes Maternal Grandmother

 High cholesterol Maternal Grandmother

 Psychiatry Maternal Grandmother

 Arthritis Maternal Grandfather

 Heart Maternal Grandfather

 Diabetes Maternal Grandfather

 High cholesterol Maternal Grandfather

 Breast Cancer Mother

 Arthritis Mother

 Birth defects Brother

 Other - see comments Brother

     cerebral  palsy

 Arthritis Maternal Aunt



Family Status

Relation Name Status

 MGMo  

 MGFa  

 Mo  Alive

 Bro  

 Fa  Alive

 MAunt  (Not Specified)



No past surgical history on file.

Social History



Socioeconomic History

 Marital status: Single

  Spouse name: Not on file

 Number of children: 3

 Years of education: GED

 Highest education level: Not on file

Occupational History

 Occupation: none

Social Needs

 Financial resource strain: Not on file

 Food insecurity:

  Worry: Not on file

  Inability: Not on file

 Transportation needs:

  Medical: Not on file

  Non-medical: Not on file

Tobacco Use

 Smoking status: Never Smoker

 Smokeless tobacco: Never Used

Substance and Sexual Activity

 Alcohol use: No

 Drug use: No

 Sexual activity: Yes

  Partners: Male

  Birth control/protection: None

  Comment: Last sexual intercourse 2018

Lifestyle

 Physical activity:

  Days per week: Not on file

  Minutes per session: Not on file

 Stress: Not on file

Relationships

 Social connections:

  Talks on phone: Not on file

  Gets together: Not on file

  Attends Islam service: Not on file

  Active member of club or organization: Not on file

  Attends meetings of clubs or organizations: Not on file

  Relationship status: Not on file

 Intimate partner violence:

  Fear of current or ex partner: Not on file

  Emotionally abused: Not on file

  Physically abused: Not on file

  Forced sexual activity: Not on file

Other Topics Concern

  Service Not Asked

 Blood Transfusions No

 Caffeine Concern Not Asked

 Occupational Exposure Not Asked

 Hobby Hazards Not Asked

 Sleep Concern Not Asked

 Stress Concern Not Asked

 Weight Concern Not Asked

 Special Diet Not Asked

 Back Care Not Asked

 Exercise Not Asked

 Bike Helmet Not Asked

 Seat Belt Not Asked

 Self-Exams Not Asked

Social History Narrative

 Pt states she has no Islam preference

 Lives with family

 No domestic violence or abuse



Social History



Substance and Sexual Activity

Sexual Activity Yes

 Partners: Male

 Birth control/protection: None

 Comment: Last sexual intercourse 2018







Labs

I have reviewed the patient's labs. and Labs are pending.



Radiology

No new radiology.



Allergies

Orly has No Known Allergies.



Medications

Orly has a current medication list which includes the following prescription(s
): nitrofurantoin&amp;nit. macrocryst.



Review of Systems

Constitutional: Negative for chills and fever.

Gastrointestinal: Negative for abdominal pain.

Genitourinary: Positive for dysuria, urgency and frequency. Negative for 
bladder incontinence, hematuria, flank pain and difficulty urinating.



BP (!) 133/91 (BP Location: Right arm, Patient Position: Sitting, BP CUFF SIZE: 
Adult Small)  | Pulse 95  | Temp 36.4 C (97.5 F) (Oral)  | Resp 18  | Ht 5' 
11" (1.803 m)  | Wt 336 lb (152.4 kg)  | BMI 46.86 kg/m

Pregravid BMI: Could not be calculated



Physical Exam

Vitals reviewed.

Constitutional: She is oriented to person, place, and time. She appears well-
developed and well-nourished. Her body habitus is obese.

Pulmonary/Chest: Normal inspiratory effort.

Abdominal: There is no CVA tenderness.

Neuro/Psychiatric: She has a normal mood and affect. She is oriented to person, 
place, and time.





Assessment/Plan



1. Dysuria

2. Abnormal urinalysis

UA with +nitrites, blood and protein. Will treat empirically and send urine 
culture.

Advised to force fluids 10 + glasses good fluids daily. Good Hygiene. Urinate 
after intercourse. Avoid caffeine products. Avoid spandex and tight clothing. 
Take all meds as instructed. Call or RTC withany increase in symp or fever/
chills. ER warnings reviewed



- POCT URINALYSIS W/O SPECIFIC GRAVITY

- URINE CULTURE

- Nitrofurantoin&amp;Nit. Macrocryst (MACROBID) 100 mg capsule; Take 1 capsule 
by mouth 2 (two) times daily for 7 days.  Dispense: 14 capsule; Refill: 0



Discussed treatment options.

Medications as ordered.

Reviewed patient instructions and provided printed copy.



This visit did not involve counseling and coordination that comprised more than 
50% of the visit time.

Electronically signed by Kirsty Gonzalez FNP at 08/15/2019  1:19 PM 
CDTdocumented in this encounter



Plan of Treatment







 Date  Type  Specialty  Care Team  Description

 

 11/15/2019  Office Visit  [facility]  Kirsty Gonzalez FNP



  



       [address] [place] [address]



  



       [phone] (Fax)  









 Name  Type  Priority  Associated Diagnoses  Date/Time

 

 URINE CULTURE  LAB  Routine  Dysuria  08/15/2019  1:08 PM CDT









 Health Maintenance  Due Date  Last Done  Comments

 

 VARICELLA VACCINES (1 of 2 -  1995    



 13+ 2-dose series)      

 

 INFLUENZA VACCINE (#1)  2019    

 

 PAP SMEAR  2021,  



     2013,  



     10/21/2003  

 

 DTaP,Tdap,and Td Vaccines (3  2023,  



 - Td)    1999  

 

 PNEUMOCOCCAL 0-64 YEARS  Aged Out    No longer eligible based



 COMBINED SERIES      on patient's age to



       complete this topic



documented as of this encounter



Procedures







 Procedure Name  Priority  Date/Time  Associated  Comments



       Diagnosis  

 

 POCT URINALYSIS W/O  Routine  08/15/2019 12:53 PM  Dysuria  Results for this



 SPECIFIC GRAVITY    CDT    procedure are in



         the results



         section.



documented in this encounter



Results

POCT URINALYSIS W/O SPECIFIC GRAVITY (08/15/2019 12:53 PM CDT)





 Component  Value  Ref Range  Performed At  Pathologist



         Signature

 

 POCT PH U  5Comment: clean  5 - 8 mg/dl    



   clatch collection      

 

 POCT U LEUK EST  neg  Negative -    



     Negative    

 

 POCT U NIT  pos  Negative -    



     Negative    

 

 POCT U PROT  trace  Negative -    



     Negative    

 

 POCT U GLU  neg  Negative -    



     Negative    

 

 POCT U KETONE  neg  Negative -    



     Negative    

 

 POCT U BLD  trace  Negative -    



     Negative    









 Specimen

 

 Urine - URINE, CLEAN CATCH



documented in this encounter



Visit Diagnoses







 Diagnosis

 

 Dysuria - Primary

 

 Abnormal urinalysis







 Other nonspecific finding on examination of urine



documented in this encounter



Insurance







 Payer  Benefit Plan  Subscriber ID  Effective  Phone  Address  Type



   / Group    Dates      

 

 [facility]  xxxxxxxxx  10/1/2018-Pres  [phone]  [address]  
Medicaid



 WOMEN      [address]



  



           [place]  



           [address]  









 Guarantor Name  Account Type  Relation to  Date of Birth  Phone  Billing



     Patient      Address

 

 Orly Grover Delfina  [facility]  Self  1980  [phone]  [address]



         (Home)  [address]







           [place]



           [address]



documented as of this encounter



Advance Directives







 Name  Relationship  Healthcare Agent  Communication



     Relationship  

 

 Maribel Segura  Mother  Primary healthcare agent  [phone]



       (Home)[phone] (Mobile)

 

 Parminder Grover  Other  First alternate healthcare  [phone]



     agent  (Mobile)[phone] (Home)

## 2020-02-17 NOTE — XMS REPORT
Summary of Care

 Created on:2019



Patient:Orly Grover

Sex:Female

:1980

External Reference #:[id number]





Demographics







 Address  [address]



   [place] [address]

 

 Mobile Phone  [phone]

 

 Home Phone  [phone]

 

 Phone  [phone]

 

 Email Address  [email or web address]

 

 Preferred Language  English

 

 Marital Status  Single

 

 Mosque Affiliation  Unknown

 

 Race  White

 

 Ethnic Group  Not  or 









Author







 Organization  [facility]

 

 Address  [address] [place] [address]









Support







 Name  Relationship  Address  Phone

 

 Maribel Segura  Unavailable  [address]  [phone]



     [place] [address]  

 

 Parminder Grover  Unavailable  [address]  [phone]



     [place] [address]  









Care Team Providers







 Name  Role  Phone

 

 Kirsty Gonzalez  Primary Care Provider  [phone]









Reason for Visit







 Reason  Comments

 

 Talk To Nurse  







Encounter Details







 Date  Type  Department  Care Team  Description

 

 2019  Telephone  [facility]-  Kirsty Gonzalez, MIRANDA



  Talk To Nurse



     [place]



  [address]



  



     [address] [facility] [address]



  



     [place] [address]



  [place] [address] [phone] (Fax)  







Allergies

No Known Allergiesdocumented as of this encounter (statuses as of 2019)



Medications

No known medicationsdocumented as of this encounter (statuses as of 2019)



Active Problems







 Problem  Noted Date

 

 BMI 40.0-44.9, adult  2018

 

 Abnormal uterine bleeding  2015

 

 Morbid obesity  2013



documented as of this encounter (statuses as of 2019)



Resolved Problems







 Problem  Noted Date  Resolved Date

 

 Yeast infection of the vagina  2015  08/15/2019

 

 Nexplanon in place  2015  08/15/2019

 

 Surveillance of previously prescribed contraceptive method  2015  08/15/
2019









 Overview: 







 ICD10 Diagnosis Term Replacer Utility









 Encounter for routine gynecological examination  2015  08/15/2019









 Overview: 







 ICD10 Diagnosis Term Replacer Utility









 Encounter for removal of intrauterine contraceptive device  2013

 

 Insertion of Nexplanon  2013









 Overview: 







 Exp 2016









 Encounter for routine gynecological examination  2013









 Overview: 







 ICD10 Diagnosis Term Replacer Utility









 Presence of intrauterine contraceptive device  2013









 Overview: 



Mirena IUD expires 2014.



 



 Medical records received from [facility] 2013. Impression
: IUD in good position within the endometrium. Two small cystic structures in 
the left adnexa probably representing ovarian cysts.



 



 Medical records received from [facility] 2013. Pelvic US: Impression: IUD 
is in place in the fundal endometrium. Otherwise negative study.









 Metrorrhagia  2013









 Overview: 







 Irregular bleeding in 2013.









 Elevated blood pressure reading without diagnosis of  2013



 hypertension    

 

 Need for prophylactic vaccination with combined  2013



 diphtheria-tetanus-pertussis (DTP) vaccine    









 Overview: 







 Out of stock 2013.



documented as of this encounter (statuses as of 2019)



Immunizations







 Name  Administration Dates  Next Due

 

 PPD (TB)  2015  

 

 Rubella  2004  

 

 Td  1999  

 

 Tdap  2013  



documented as of this encounter



Social History







 Tobacco Use  Types  Packs/Day  Years Used  Date

 

 Never Smoker        









 Smokeless Tobacco: Never Used      









 Alcohol Use  Drinks/Week  oz/Week  Comments

 

 No      









 Sex Assigned at Birth  Date Recorded

 

 Not on file  









 Job Start Date  Occupation  Industry

 

 Not on file  Not on file  Not on file









 Travel History  Travel Start  Travel End









 No recent travel history available.



documented as of this encounter



Last Filed Vital Signs

Not on filedocumented in this encounter



Plan of Treatment







 Date  Type  Specialty  Care Team  Description

 

 2019  Technician Visit  OB Kirsty Dean FNP



[address] [place] [address]



[phone] (Fax)  



       



Audi Culver  

 

 11/15/2019  Office Visit  [facility] Kirsty Dean FNP



  



       [address]



  



       [place] [address]



  



       [phone] (Fax)  









 Health Maintenance  Due Date  Last Done  Comments

 

 VARICELLA VACCINES (1 of 2 -  1995    



 13+ 2-dose series)      

 

 INFLUENZA VACCINE (#1)  2019    

 

 PAP SMEAR  2021,  



     2013,  



     10/21/2003  

 

 DTaP,Tdap,and Td Vaccines (3  2023,  



 - Td)    1999  

 

 PNEUMOCOCCAL 0-64 YEARS  Aged Out    No longer eligible based



 COMBINED SERIES      on patient's age to



       complete this topic



documented as of this encounter



Results

Not on filedocumented in this encounter



Insurance







 Payer  Benefit Plan  Subscriber ID  Effective  Phone  Address  Type



   / Group    Dates      

 

 [facility]  xxxxxxxxx  10/1/2018-Pres  [phone]  [address]  
Medicaid



 WOMEN      ent  2005



  



           [place], [place]  



           [address]  

 

 [facility]  [facility]  [facility]-Prese  [phone]  [address]  Medicaid



       [address]  [address]



  



           [place]  



           [address]  



documented as of this encounter



Advance Directives







 Name  Relationship  Healthcare Agent  Communication



     Relationship  

 

 Maribel Segura  Mother  Primary healthcare agent  [phone]



       (Home)[phone] (Mobile)

 

 Parminder Grover  Other  First alternate healthcare  [phone]



     agent  (Mobile)[phone] (Home)

## 2021-03-06 ENCOUNTER — HOSPITAL ENCOUNTER (EMERGENCY)
Dept: HOSPITAL 97 - ER | Age: 41
Discharge: HOME | End: 2021-03-06
Payer: SELF-PAY

## 2021-03-06 VITALS — SYSTOLIC BLOOD PRESSURE: 132 MMHG | DIASTOLIC BLOOD PRESSURE: 82 MMHG | OXYGEN SATURATION: 100 %

## 2021-03-06 VITALS — TEMPERATURE: 99.8 F

## 2021-03-06 DIAGNOSIS — H66.92: Primary | ICD-10-CM

## 2021-03-06 DIAGNOSIS — H60.8X2: ICD-10-CM

## 2021-03-06 DIAGNOSIS — Z20.822: ICD-10-CM

## 2021-03-06 LAB — SARS-COV-2 RNA RESP QL NAA+PROBE: NEGATIVE

## 2021-03-06 PROCEDURE — 0240U: CPT

## 2021-03-06 PROCEDURE — 99283 EMERGENCY DEPT VISIT LOW MDM: CPT

## 2021-03-06 NOTE — XMS REPORT
Continuity of Care Document

                            Created on:2021



Patient:DARLENE JORGE

Sex:Female

:1980

External Reference #:[id number]





Demographics







                          Address                   [address]



                                                    [place] [address]

 

                          Home Phone                [phone]

 

                          Mobile Phone              [phone]

 

                          Email Address             NONE

 

                          Preferred Language        English

 

                          Marital Status            Unknown

 

                          Jewish Affiliation     Unknown

 

                          Race                      Unknown

 

                          Additional Race(s)        Unavailable



                                                    White

 

                          Ethnic Group              Unknown









Author







                          Organization              [facility]

t

 

                          Address                   [address]



                                                    [place] [address]

 

                          Phone                     [phone]









Support







                Name            Relationship    Address         Phone

 

                Imelda        Mother          [address]      [phone]



                                                [place] [address] 

 

                Lenore           Other           [address] [phone]

[phone]



                                                [place] [address] 









Care Team Providers







                    Name                Role                Phone

 

                    Pool,  Resident     Attending Clinician Unavailable









Problems

This patient has no known problems.



Allergies, Adverse Reactions, Alerts

This patient has no known allergies or adverse reactions.



Medications

This patient has no known medications.



Procedures

This patient has no known procedures.



Encounters







        Start   End     Encounter Admission Attending Care    Care    Encounter 

Source



        Date/Time Date/Time Type    Type    Clinicians Facility Department ID   

   

 

        2020-10-09 2020-10-09 Office          [place], [facility] [phone]

[id number] 



        13:17:12 14:12:18 Visit            [facility] [id number]         



                                                [facility] [id number]         



                                                        [phone]         



                                                        113             







Results

This patient has no known results.

## 2021-03-06 NOTE — ER
Nurse's Notes                                                                                     

 [facility]                                                                 

Name: Orly Grover                                                                                

Age: 41 yrs                                                                                       

Sex: Female                                                                                       

: 1980                                                                                   

MRN: [id number]                                                                                   

Arrival Date: 2021                                                                          

Time: 14:37                                                                                       

Account#: [id number]                                                                            

Bed 27                                                                                            

Private MD:                                                                                       

Diagnosis: Otitis media, unspecified, left ear;Other otitis externa, left ear                     

                                                                                                  

Presentation:                                                                                     

                                                                                             

14:46 Chief complaint: Patient states: has been having left ear pain and drainage since       iw  

      Thursday and headache yesterday and congestion, was also exposed to COVID at work last      

      week , has been having fever also. Coronavirus screen: fever. Ebola Screen: Patient         

      negative for fever greater than or equal to 101.5 degrees Fahrenheit, and additional        

      compatible Ebola Virus Disease symptoms Patient denies exposure to infectious person.       

      Patient denies travel to an Ebola-affected area in the 21 days before illness onset. No     

      symptoms or risks identified at this time. Initial Sepsis Screen: Does the patient meet     

      any 2 criteria? No. Patient's initial sepsis screen is negative. Does the patient have      

      a suspected source of infection? No. Patient's initial sepsis screen is negative. Risk      

      Assessment: Do you want to hurt yourself or someone else? Patient reports no desire to      

      harm self or others. Onset of symptoms was 2021.                                  

14:46 Method Of Arrival: Ambulatory                                                           iw  

14:46 Acuity: KAREN 4                                                                           iw  

                                                                                                  

OB/GYN:                                                                                           

17:04 LMP N/A - Irregular menses                                                              ca1 

                                                                                                  

Historical:                                                                                       

- Allergies:                                                                                      

14:48 No Known Allergies;                                                                     iw  

- Home Meds:                                                                                      

14:48 Seroquel 50 mg Oral tab 1 tab nightly [Active];                                         iw  

- PSHx:                                                                                           

14:48 Hysterectomy;                                                                           iw  

                                                                                                  

- Immunization history:: Adult Immunizations not up to date.                                      

- Social history:: Smoking status: Patient denies any tobacco usage or history of.                

                                                                                                  

                                                                                                  

Screening:                                                                                        

15:00 Abuse screen: Denies threats or abuse. Denies injuries from another. Nutritional        ca1 

      screening: No deficits noted. Tuberculosis screening: No symptoms or risk factors           

      identified. Fall Risk None identified.                                                      

                                                                                                  

Assessment:                                                                                       

15:00 General: Appears in no apparent distress. comfortable, Behavior is calm, cooperative,   ca1 

      appropriate for age. General: Reports fever for 1-2 days. Pain: Complains of pain in        

      left ear Pain currently is 5 out of 10 on a pain scale. Pain began 2-3 days ago. Neuro:     

      Level of Consciousness is awake, alert, obeys commands, Oriented to person, place,          

      time, situation, Reports headache. EENT: Tympanic membrane reddened on left ear Ear         

      canal clear on left ear and right ear. Derm: Skin is intact, is healthy with good           

      turgor, Skin is pink, warm \T\ dry. Musculoskeletal: Circulation, motion, and sensation     

      intact. Capillary refill < 3 seconds.                                                       

16:00 Reassessment: Patient appears in no apparent distress at this time. Patient and/or      ca1 

      family updated on plan of care and expected duration. Pain level reassessed. Patient is     

      alert, oriented x 3, equal unlabored respirations, skin warm/dry/pink.                      

17:00 Reassessment: Patient appears in no apparent distress at this time. Patient is alert,   ca1 

      oriented x 3, equal unlabored respirations, skin warm/dry/pink.                             

                                                                                                  

Vital Signs:                                                                                      

14:46  / 75; Pulse 91; Resp 16; Temp 99.8; Pulse Ox 100% on R/A; Weight 157.85 kg;      iw  

      Height 5 ft. 11 in. (180.34 cm); Pain 6/10;                                                 

16:00  / 81; Pulse 79; Resp 16 S; Pulse Ox 99% on R/A;                                  ca1 

17:00  / 82; Pulse 86; Resp 16 S; Pulse Ox 100% on R/A;                                 ca1 

14:46 Body Mass Index 48.54 (157.85 kg, 180.34 cm)                                            iw  

                                                                                                  

ED Course:                                                                                        

14:37 Patient arrived in ED.                                                                  am2 

14:47 [facility]C is [facility]                                                        kb  

14:47 Liu Fox MD is Attending Physician.                                             kb  

14:48 Triage completed.                                                                       iw  

14:48 Arm band placed on.                                                                     iw  

14:52 Elsy Roland, RN is Primary Nurse.                                                      ca1 

15:00 Patient has correct armband on for positive identification. Bed in low position. Call   ca1 

      light in reach. Side rails up X 1. Pulse ox on. NIBP on.                                    

17:04 No provider procedures requiring assistance completed. Patient did not have IV access   ca1 

      during this emergency room visit.                                                           

                                                                                                  

Administered Medications:                                                                         

No medications were administered                                                                  

                                                                                                  

                                                                                                  

Outcome:                                                                                          

16:49 Discharge ordered by MD.                                                                kb  

17:04 Discharged to home ambulatory.                                                          ca1 

17:04 Condition: stable                                                                           

17:04 Discharge instructions given to patient, Instructed on discharge instructions, follow       

      up and referral plans. medication usage, Demonstrated understanding of instructions,        

      follow-up care, medications, Prescriptions given X 2.                                       

17:04 Patient left the ED.                                                                    ca1 

                                                                                                  

Signatures:                                                                                       

Angie Sharma, MIRANDA-ANA JEAN-Nel Kenny, RN                     RN   iw                                                   

Orly Duval am2                                                  

Elsy Roland RN RN   ca1                                                  

                                                                                                  

**************************************************************************************************

## 2021-03-06 NOTE — EDPHYS
Physician Documentation                                                                           

 [facility]                                                                 

Name: Orly Grover                                                                                

Age: 41 yrs                                                                                       

Sex: Female                                                                                       

: 1980                                                                                   

MRN: [id number]                                                                                   

Arrival Date: 2021                                                                          

Time: 14:37                                                                                       

Account#: [id number]                                                                            

Bed 27                                                                                            

Private MD:                                                                                       

ED Physician Liu Fox                                                                      

HPI:                                                                                              

                                                                                             

00:02 This 41 yrs old  Female presents to ER via Ambulatory with complaints of Ear   kb  

      Pain, Fever.                                                                                

00:02 The patient presents with pain. The complaints affect the left ear. Onset: The          kb  

      symptoms/episode began/occurred 3 day(s) ago. Modifying factors: The symptoms are           

      alleviated by nothing, the symptoms are aggravated by nothing. Associated signs and         

      symptoms: Pertinent positives: fever, rhinorrhea. Severity of symptoms: At their worst      

      the symptoms were mild in the emergency department the symptoms are unchanged. The          

      patient has experienced similar episodes in the past. The patient has not recently seen     

      a physician. Pt reports left ear pain for 3 days. States she has had issues with that       

      ear since she was little. Also reports cough, congestion since yesterday and had a          

      covid exposure .                                                                            

                                                                                                  

OB/GYN:                                                                                           

                                                                                             

17:04 LMP N/A - Irregular menses                                                              ca1 

                                                                                                  

Historical:                                                                                       

- Allergies:                                                                                      

14:48 No Known Allergies;                                                                     iw  

- Home Meds:                                                                                      

14:48 Seroquel 50 mg Oral tab 1 tab nightly [Active];                                         iw  

- PSHx:                                                                                           

14:48 Hysterectomy;                                                                           iw  

                                                                                                  

- Immunization history:: Adult Immunizations not up to date.                                      

- Social history:: Smoking status: Patient denies any tobacco usage or history of.                

                                                                                                  

                                                                                                  

ROS:                                                                                              

                                                                                             

00:11 Cardiovascular: Negative for chest pain, palpitations, and edema, Abdomen/GI: Negative  kb  

      for abdominal pain, nausea, vomiting, diarrhea, and constipation, Back: Negative for        

      injury and pain, MS/Extremity: Negative for injury and deformity, Skin: Negative for        

      injury, rash, and discoloration, Neuro: Negative for headache, weakness, numbness,          

      tingling, and seizure.                                                                      

      Constitutional: Positive for fever.                                                         

      ENT: Positive for rhinorrhea, sinus congestion.                                             

      Respiratory: Positive for cough, Negative for dyspnea on exertion, hemoptysis,              

      orthopnea, pleurisy, shortness of breath, sputum production, wheezing.                      

                                                                                                  

Exam:                                                                                             

00:12 Constitutional:  This is a well developed, well nourished patient who is awake, alert,  kb  

      and in no acute distress. Head/Face:  Normocephalic, atraumatic. Cardiovascular:            

      Regular rate and rhythm with a normal S1 and S2.  No gallops, murmurs, or rubs.  No         

      pulse deficits. Respiratory:  Lungs have equal breath sounds bilaterally, clear to          

      auscultation.  No rales, rhonchi or wheezes noted.  No increased work of breathing, no      

      retractions or nasal flaring. Abdomen/GI:  Soft, non-tender, with normal bowel sounds.      

      No distension.  No guarding or rebound.  No evidence of tenderness throughout. Skin:        

      Warm, dry with normal turgor.  Normal color with no rashes, no lesions, and no evidence     

      of cellulitis. MS/ Extremity:  Pulses equal, no cyanosis.  Neurovascular intact.  Full,     

      normal range of motion. Neuro:  Awake and alert, GCS 15, oriented to person, place,         

      time, and situation.  Cranial nerves II-XII grossly intact. Moves all extremities.          

      Sensory grossly intact.  Cerebellar exam normal.  Normal gait.                              

00:12 ENT: Ear canal(s): erythema, that is moderate, of the left canal, TM's: dullness, on        

      the left, fluid levels, on the left.                                                        

                                                                                                  

Vital Signs:                                                                                      

                                                                                             

14:46  / 75; Pulse 91; Resp 16; Temp 99.8; Pulse Ox 100% on R/A; Weight 157.85 kg;      iw  

      Height 5 ft. 11 in. (180.34 cm); Pain 6/10;                                                 

16:00  / 81; Pulse 79; Resp 16 S; Pulse Ox 99% on R/A;                                  ca1 

17:00  / 82; Pulse 86; Resp 16 S; Pulse Ox 100% on R/A;                                 ca1 

14:46 Body Mass Index 48.54 (157.85 kg, 180.34 cm)                                            iw  

                                                                                                  

MDM:                                                                                              

14:51 Patient medically screened.                                                             [facility] 

                                                                                             

00:01 Data reviewed: vital signs, nurses notes. Data interpreted: Pulse oximetry: on room air kb  

      is 100 %. Interpretation: normal. Counseling: I had a detailed discussion with the          

      patient and/or guardian regarding: the historical points, exam findings, and any            

      diagnostic results supporting the discharge/admit diagnosis, lab results, the need for      

      outpatient follow up, a family practitioner, to return to the emergency department if       

      symptoms worsen or persist or if there are any questions or concerns that arise at home.    

                                                                                                  

                                                                                             

16:35 Order name: COVID-19/FLU A+B; Complete Time: 16:38                                      EDMS

                                                                                                  

Administered Medications:                                                                         

No medications were administered                                                                  

                                                                                                  

                                                                                                  

Disposition:                                                                                      

09: Co-signature as Attending Physician, Liu Fox MD I agree with the assessment and  richie 

      plan of care.                                                                               

                                                                                                  

Disposition:                                                                                      

21 16:49 Discharged to Home. Impression: Otitis media, unspecified, left ear, Other         

  otitis externa, left ear.                                                                       

- Condition is Stable.                                                                            

- Discharge Instructions: Otitis Externa, Easy-to-Read, Otitis Media, Pediatric,                  

  Easy-to-Read, Ear Drops, Adult, Easy-to-Read.                                                   

- Prescriptions for Amoxicillin 875 mg Oral Tablet - take 1 tablet by ORAL route every            

  12 hours for 10 days; 20 tablet. Ciprodex 0.3- 0.1 % Otic Drops, Suspension - instill           

  4 drop by OTIC route every 12 hours for 7 days , for ears ONLY; 1 Container.                    

- Medication Reconciliation Form, Thank You Letter, Antibiotic Education, Prescription            

  Opioid Use form.                                                                                

- Follow up: Emergency Department; When: As needed; Reason: Worsening of condition.               

  Follow up: Private Physician; When: 2 - 3 days; Reason: Recheck today's complaints,             

  Continuance of care, Re-evaluation by your physician.                                           

                                                                                                  

                                                                                                  

                                                                                                  

Signatures:                                                                                       

Dispatcher [employer]                           EDMS                                                 

Angie Sharma, MIRANDA-ANA JEAN-Liu Chong MD MD cha Williams, Irene, JONNIE CRISTINA   iw                                                   

Elsy Roland RN                        RN   ca1                                                  

                                                                                                  

Corrections: (The following items were deleted from the chart)                                    

03                                                                                             

15:17 14:57 Influenza Screen (A \T\ B)+BA.LAB.BRZ ordered. [facility]

15:17 14:57 CORONAVIRUS+MR.LAB.BRZ ordered. [facility]                                              EDMS

17:04 16:49 2021 16:49 Discharged to Home. Impression: Otitis media, unspecified, left  ca1 

      ear; Other otitis externa, left ear. Condition is Stable. Forms are Medication              

      Reconciliation Form, Thank You Letter, Antibiotic Education, Prescription Opioid Use.       

      Follow up: Emergency Department; When: As needed; Reason: Worsening of condition.           

      Follow up: Private Physician; When: 2 - 3 days; Reason: Recheck today's complaints,         

      Continuance of care, Re-evaluation by your physician. kb                                    

                                                                                                  

**************************************************************************************************